# Patient Record
(demographics unavailable — no encounter records)

---

## 2025-07-08 NOTE — DISCUSSION/SUMMARY
[FreeTextEntry1] : I spent the time noted on the day of this patient encounter preparing for, providing and documenting the above service. I have counseled and educated the patient on the differential, workup, disease course, and treatment/management plan. Education was provided to the patient during this encounter. All questions and concerns were answered and addressed in detail.  Niya Khan NP, FNP-BC

## 2025-07-08 NOTE — PLAN
[FreeTextEntry1] : b/l breast sonogram ordered  GI referral sent for abdominal pain *upper abdominal pain*

## 2025-07-08 NOTE — HISTORY OF PRESENT ILLNESS
[FreeTextEntry1] : Pt is a 27-year-old who reports episodic b/l breast pain for one month. Pt also verbalizes during her cycle in June; she experiences upper abdominal pain that went away. Pt denies trauma to chest, dimpling, skin changes, and nipple discharge.

## 2025-07-08 NOTE — PHYSICAL EXAM
[Chaperoned Physical Exam] : A chaperone was present in the examining room during all aspects of the physical examination. [MA] : MA [FreeTextEntry2] : Miladis BLEDSOE  [Appropriately responsive] : appropriately responsive [Alert] : alert [No Acute Distress] : no acute distress [Mass] : mass [Tender] : tender [Soft] : soft [Non-tender] : non-tender [Non-distended] : non-distended [No HSM] : No HSM [No Lesions] : no lesions [No Mass] : no mass [Oriented x3] : oriented x3 [FreeTextEntry6] : 8:00 biopsy confirmed fibroadenoma right breast 2cm  [Examination Of The Breasts] : a normal appearance [Normal] : normal [Tenderness Of The Right Breast] : tenderness [___cm] : a ~M [unfilled] ~Ucm inferior lateral quadrant mass was palpated [Tenderness Of The Left Breast] : tenderness [No Masses] : no breast masses were palpable

## 2025-07-08 NOTE — REVIEW OF SYSTEMS
[Breast Pain] : breast pain [Breast Lump] : breast lump [Negative] : Heme/Lymph [de-identified] : episodic b/l breast pain

## 2025-07-30 NOTE — ASSESSMENT
[FreeTextEntry1] : Will give plan for steroid taper with prednisone prn meloxicam and rizatriptan consider use of cgrp antibody referred to [facility]. rtc with np

## 2025-07-30 NOTE — HISTORY OF PRESENT ILLNESS
[Headache] : headache [Dizziness] : dizziness [Nausea] : nausea [Vomiting] : Vomiting [Photophobia] : photophobia [Phonophobia] : phonophobia [Stable] : The patient reports ~his/her~ symptoms since the last visit are stable [FreeTextEntry1] : Pt is 26 yo woman who has history of migraine since young age.  Has been seeing Dr. Hernandez over this year as her headaches had gotten worse the last year or so.  Frequency and severity of these events had been getting worse. The last 3 weeks the headaches have been near daily.  has been light headed and dizzy. Had MRI * 2 due to ? wmd which showed no change after repeated done 2 weeks ago (results not currently available.) Does note that she has had more constant headaches and last week severe event. Had seen Dr. Fan who gave steroid - which helped with dizziness but headache more persistent.) Was given medrol pack- no se's but only partially effective. With Nurtec finds it is not going away but gets slightly better. Early knows that her vision goes blurry, then nauseated and will use the med. Will sit in dark room with cold cap. With events gets nauseated, severe pain  Some clicking, mild reduced range of motion of jaw. In spironolactone, probiotics. No allergies. Sleep generally ok.  Did have bloodwork 2 months ago.   [Neck Pain] : no neck pain [Scalp Tenderness] : no scalp tenderness

## 2025-07-30 NOTE — REVIEW OF SYSTEMS
[Fever] : no fever [Feeling Poorly] : feeling poorly [Eye Pain] : eye pain [Eyesight Problems] : eyesight problems [Nasal Discharge] : no nasal discharge [Chest Pain] : no chest pain [Cough] : no cough [Constipation] : no constipation [Arthralgias] : arthralgias [Neck Pain] : neck pain [Skin Lesions] : no skin lesions [Itching] : no itching [As Noted in HPI] : as noted in HPI [Confused] : no confusion [Convulsions] : no convulsions [Fainting] : no fainting [Anxiety] : anxiety [Muscle Weakness] : no muscle weakness [Swollen Glands] : no swollen glands [de-identified] : reactive.

## 2025-07-30 NOTE — ASSESSMENT
[FreeTextEntry1] : Will give plan for steroid taper with prednisone prn meloxicam and rizatriptan consider use of cgrp antibody referred to [facility]. rtc with np

## 2025-07-30 NOTE — HISTORY OF PRESENT ILLNESS
[Headache] : headache [Dizziness] : dizziness [Nausea] : nausea [Vomiting] : Vomiting [Photophobia] : photophobia [Phonophobia] : phonophobia [Stable] : The patient reports ~his/her~ symptoms since the last visit are stable [FreeTextEntry1] : Pt is 28 yo woman who has history of migraine since young age.  Has been seeing Dr. Hernandez over this year as her headaches had gotten worse the last year or so.  Frequency and severity of these events had been getting worse. The last 3 weeks the headaches have been near daily.  has been light headed and dizzy. Had MRI * 2 due to ? wmd which showed no change after repeated done 2 weeks ago (results not currently available.) Does note that she has had more constant headaches and last week severe event. Had seen Dr. Fan who gave steroid - which helped with dizziness but headache more persistent.) Was given medrol pack- no se's but only partially effective. With Nurtec finds it is not going away but gets slightly better. Early knows that her vision goes blurry, then nauseated and will use the med. Will sit in dark room with cold cap. With events gets nauseated, severe pain  Some clicking, mild reduced range of motion of jaw. In spironolactone, probiotics. No allergies. Sleep generally ok.  Did have bloodwork 2 months ago.   [Neck Pain] : no neck pain [Scalp Tenderness] : no scalp tenderness

## 2025-07-30 NOTE — PHYSICAL EXAM
[General Appearance - Alert] : alert [General Appearance - Well Nourished] : well nourished [General Appearance - Well Developed] : well developed [General Appearance - Well-Appearing] : healthy appearing [Oriented To Time, Place, And Person] : oriented to person, place, and time [Impaired Insight] : insight and judgment were intact [Affect] : the affect was normal [Memory Recent] : recent memory was not impaired [Memory Remote] : remote memory was not impaired [Person] : oriented to person [Place] : oriented to place [Time] : oriented to time [Short Term Intact] : short term memory intact [Remote Intact] : remote memory intact [Registration Intact] : recent registration memory intact [Concentration Intact] : normal concentrating ability [Visual Intact] : visual attention was ~T not ~L decreased [Fluency] : fluency intact [Comprehension] : comprehension intact [Current Events] : adequate knowledge of current events [Past History] : adequate knowledge of personal past history [Vocabulary] : adequate range of vocabulary [Cranial Nerves Oculomotor (III)] : extraocular motion intact [Cranial Nerves Facial (VII)] : face symmetrical [Cranial Nerves Vestibulocochlear (VIII)] : hearing was intact bilaterally [Cranial Nerves Accessory (XI - Cranial And Spinal)] : head turning and shoulder shrug symmetric [Cranial Nerves Hypoglossal (XII)] : there was no tongue deviation with protrusion [Motor Strength] : muscle strength was normal in all four extremities [No Muscle Atrophy] : normal bulk in all four extremities [Motor Handedness Right-Handed] : the patient is right hand dominant [Motor Strength Upper Extremities Bilaterally] : strength was normal in both upper extremities [Sensation Tactile Decrease] : light touch was intact [Allodynia] : no ~T allodynia present [Abnormal Walk] : normal gait [Past-pointing] : there was no past-pointing [Tremor] : no tremor present [Dysdiadochokinesia Bilaterally] : not present [Coordination - Dysmetria Impaired Finger-to-Nose Bilateral] : not present [Sclera] : the sclera and conjunctiva were normal [No PEDRO] : no internuclear ophthalmoplegia [Strabismus] : no strabismus was seen [Exaggerated Use Of Accessory Muscles For Inspiration] : no accessory muscle use [Edema] : there was no peripheral edema [Nail Clubbing] : no clubbing  or cyanosis of the fingernails [Involuntary Movements] : no involuntary movements were seen [Skin Color & Pigmentation] : normal skin color and pigmentation [] : no rash

## 2025-07-30 NOTE — REVIEW OF SYSTEMS
[Fever] : no fever [Feeling Poorly] : feeling poorly [Eye Pain] : eye pain [Eyesight Problems] : eyesight problems [Nasal Discharge] : no nasal discharge [Chest Pain] : no chest pain [Cough] : no cough [Constipation] : no constipation [Arthralgias] : arthralgias [Neck Pain] : neck pain [Skin Lesions] : no skin lesions [Itching] : no itching [As Noted in HPI] : as noted in HPI [Confused] : no confusion [Convulsions] : no convulsions [Fainting] : no fainting [Anxiety] : anxiety [Muscle Weakness] : no muscle weakness [Swollen Glands] : no swollen glands [de-identified] : reactive.